# Patient Record
Sex: FEMALE | Race: WHITE
[De-identification: names, ages, dates, MRNs, and addresses within clinical notes are randomized per-mention and may not be internally consistent; named-entity substitution may affect disease eponyms.]

---

## 2019-04-10 ENCOUNTER — HOSPITAL ENCOUNTER (INPATIENT)
Dept: HOSPITAL 25 - MCHOB | Age: 34
LOS: 2 days | Discharge: HOME | DRG: 540 | End: 2019-04-12
Attending: OBSTETRICS & GYNECOLOGY | Admitting: OBSTETRICS & GYNECOLOGY
Payer: COMMERCIAL

## 2019-04-10 DIAGNOSIS — O34.211: Primary | ICD-10-CM

## 2019-04-10 DIAGNOSIS — Z3A.39: ICD-10-CM

## 2019-04-10 PROCEDURE — 4A1HXCZ MONITORING OF PRODUCTS OF CONCEPTION, CARDIAC RATE, EXTERNAL APPROACH: ICD-10-PCS | Performed by: OBSTETRICS & GYNECOLOGY

## 2019-04-10 PROCEDURE — 85025 COMPLETE CBC W/AUTO DIFF WBC: CPT

## 2019-04-10 PROCEDURE — 90707 MMR VACCINE SC: CPT

## 2019-04-10 PROCEDURE — 36415 COLL VENOUS BLD VENIPUNCTURE: CPT

## 2019-04-10 RX ADMIN — OXYCODONE HYDROCHLORIDE AND ACETAMINOPHEN PRN TAB: 5; 325 TABLET ORAL at 18:22

## 2019-04-10 RX ADMIN — DOCUSATE SODIUM SCH: 100 CAPSULE, LIQUID FILLED ORAL at 14:57

## 2019-04-10 RX ADMIN — SIMETHICONE CHEW TAB 80 MG SCH MG: 80 TABLET ORAL at 18:21

## 2019-04-10 RX ADMIN — SIMETHICONE CHEW TAB 80 MG SCH MG: 80 TABLET ORAL at 20:18

## 2019-04-10 RX ADMIN — DOCUSATE SODIUM SCH MG: 100 CAPSULE, LIQUID FILLED ORAL at 20:18

## 2019-04-10 RX ADMIN — IBUPROFEN SCH MG: 600 TABLET, FILM COATED ORAL at 21:41

## 2019-04-10 RX ADMIN — IBUPROFEN SCH MG: 600 TABLET, FILM COATED ORAL at 15:37

## 2019-04-10 RX ADMIN — OXYCODONE HYDROCHLORIDE AND ACETAMINOPHEN PRN TAB: 5; 325 TABLET ORAL at 21:42

## 2019-04-11 LAB
BASOPHILS # BLD AUTO: 0 10^3/UL (ref 0–0.2)
EOSINOPHIL # BLD AUTO: 0 10^3/UL (ref 0–0.6)
HCT VFR BLD AUTO: 31 % (ref 33–41)
HGB BLD-MCNC: 10.2 G/DL (ref 12–16)
LYMPHOCYTES # BLD AUTO: 3.2 10^3/UL (ref 1–4.8)
MCH RBC QN AUTO: 30 PG (ref 27–31)
MCHC RBC AUTO-ENTMCNC: 34 G/DL (ref 31–36)
MCV RBC AUTO: 89 FL (ref 80–97)
MONOCYTES # BLD AUTO: 1.5 10^3/UL (ref 0–0.8)
NEUTROPHILS # BLD AUTO: 13.1 10^3/UL (ref 1.5–7.7)
NRBC # BLD AUTO: 0 10^3/UL
NRBC BLD QL AUTO: 0
PLATELET # BLD AUTO: 195 10^3/UL (ref 150–450)
RBC # BLD AUTO: 3.42 10^6 /UL (ref 3.7–4.87)
WBC # BLD AUTO: 17.9 10^3/UL (ref 3.5–10.8)

## 2019-04-11 RX ADMIN — IBUPROFEN PRN MG: 600 TABLET, FILM COATED ORAL at 04:18

## 2019-04-11 RX ADMIN — OXYCODONE HYDROCHLORIDE AND ACETAMINOPHEN PRN TAB: 5; 325 TABLET ORAL at 02:04

## 2019-04-11 RX ADMIN — ACETAMINOPHEN PRN MG: 325 TABLET ORAL at 06:07

## 2019-04-11 RX ADMIN — IBUPROFEN PRN MG: 600 TABLET, FILM COATED ORAL at 16:09

## 2019-04-11 RX ADMIN — IBUPROFEN SCH: 600 TABLET, FILM COATED ORAL at 07:08

## 2019-04-11 RX ADMIN — SIMETHICONE CHEW TAB 80 MG SCH MG: 80 TABLET ORAL at 14:17

## 2019-04-11 RX ADMIN — DOCUSATE SODIUM SCH MG: 100 CAPSULE, LIQUID FILLED ORAL at 14:17

## 2019-04-11 RX ADMIN — DOCUSATE SODIUM SCH MG: 100 CAPSULE, LIQUID FILLED ORAL at 21:42

## 2019-04-11 RX ADMIN — IBUPROFEN PRN MG: 600 TABLET, FILM COATED ORAL at 10:02

## 2019-04-11 RX ADMIN — IBUPROFEN PRN MG: 600 TABLET, FILM COATED ORAL at 21:48

## 2019-04-11 RX ADMIN — DOCUSATE SODIUM SCH MG: 100 CAPSULE, LIQUID FILLED ORAL at 10:02

## 2019-04-11 RX ADMIN — SIMETHICONE CHEW TAB 80 MG SCH MG: 80 TABLET ORAL at 21:42

## 2019-04-11 RX ADMIN — SIMETHICONE CHEW TAB 80 MG SCH MG: 80 TABLET ORAL at 18:29

## 2019-04-11 RX ADMIN — SIMETHICONE CHEW TAB 80 MG SCH MG: 80 TABLET ORAL at 10:02

## 2019-04-11 RX ADMIN — ACETAMINOPHEN PRN MG: 325 TABLET ORAL at 21:48

## 2019-04-11 RX ADMIN — ACETAMINOPHEN PRN MG: 325 TABLET ORAL at 18:29

## 2019-04-11 NOTE — OP
DATE OF OPERATION:  04/10/19 - ROOM #117

 

DATE OF BIRTH:  [birth year]

 

SURGEON:  Carmelina Han MD

 

ASSISTANTS:  Marleen Arthur CNM and Miriam Sims CNM

 

ANESTHESIOLOGIST:  Dr. Canela.

 

ANESTHESIA:  Spinal.

 

PRE-OP DIAGNOSIS:  39 weeks' gestation with history of previous  
section.

 

POST-OP DIAGNOSIS:  39 weeks' gestation with history of previous  
section.

 

OPERATIVE PROCEDURE:  Repeat low-transverse  section with vacuum assist.

 

ESTIMATED BLOOD LOSS:  600 cc.

 

URINE OUTPUT:  150 cc.

 

IV FLUIDS:  2400 cc lactated Ringer's.

 

MATERIALS TO LAB:  Cord blood.

 

INDICATIONS:  This patient was a 33-year-old  2, para 1 at 39 weeks' 
gestation, who presented for a repeat  section today.  The patient's 
prenatal course was unremarkable.  She was extensively counseled and consent 
was signed.

 

FINDINGS:  Normal-appearing uterus, fallopian tubes, and ovaries.  Delivery was 
productive of a male infant weighing 7 pounds 9 ounces with Apgars of 9 and 9. 
Time of delivery was 11:35. 

 

COMPLICATIONS:  None.

 

DESCRIPTION OF PROCEDURE:  The risks, benefits, and alternatives were described 
to the patient, and informed consent was obtained.  The patient was taken to 
the operating room with IV running, where spinal anesthesia was induced and 
found to be adequate.  The patient was prepped and draped in normal sterile 
fashion in the dorsal supine position with a leftward tilt.  A Pfannenstiel 
skin incision was made with a scalpel through the patient's previous incision.  
This was carried down to the underlying fascia using the scalpel.  The fascia 
was scored in the midline, and the incision was extended using Quinonez scissors.  
The fascia was dissected off the underlying rectus muscles using blunt and 
sharp dissection.  The rectus muscles were  in the midline using 
dissection with a Beena clamp.  The peritoneum was then entered bluntly.  An 
Wily retractor was placed into the peritoneal cavity and tightened down 
against the skin.  A bladder flap was created sharply using Metzenbaum 
scissors.  A low transverse uterine incision was then made with the scalpel.  
This was carried down to the amniotic membranes.  The membranes were then 
ruptured, productive of clear fluid.  The uterine incision was extended using 
blunt traction.  The fetal head was elevated to the level of the incision, but 
the head could not be adequately guided up to through the incision.  A Kiwi 
vacuum was placed on the head and with gentle traction and fundal pressure, the 
head was able to be lifted to the incision.  The Kiwi then was found be 
defective, popping off with minimal tension three times.  Fortunately, at that 
point, with fundal pressure, the head delivered without difficulty.  The 
shoulders then were also both delivered and the body followed.  The infant had 
good tone and cried shortly after delivery.  The cord was doubly clamped and 
cut.  The infant was then handed to the awaiting pediatrician.

 

Cord blood was collected.  The placenta was delivered with manual extraction.  
The uterus was then exteriorized and cleared of all clots and debris.  The 
uterine incision was then reapproximated using 0 Vicryl in a running-locked 
fashion.  A second layer of imbricating 0 Vicryl sutures was then also placed 
for good hemostasis.  The posterior cul-de-sac was irrigated with saline.  The 
uterus was then returned to the abdomen.  The incision was reinspected and 
still noted to be hemostatic.  The peritoneum was closed with 3-0 Vicryl in a 
running fashion.  The fascia was closed with 0 Vicryl in a running fashion.  
The subcutaneous tissues were copiously irrigated and made hemostatic using the 
Bovie.  The subcutaneous tissues were then reapproximated using 3-0 Vicryl in 
interrupted sutures. The skin was then closed with 4-0 Monocryl in a 
subcuticular stitch and overlaid with Mastisol and steristrips.  A sterile 
bandage was then placed over the incision.  The patient tolerated the procedure 
well.  Sponge, lap, and needle counts were correct x2.

 

 [id number]/[facility] #: [id number]

MADIE

## 2019-04-12 VITALS — SYSTOLIC BLOOD PRESSURE: 125 MMHG | DIASTOLIC BLOOD PRESSURE: 76 MMHG

## 2019-04-12 RX ADMIN — ACETAMINOPHEN PRN MG: 325 TABLET ORAL at 03:34

## 2019-04-12 RX ADMIN — IBUPROFEN PRN MG: 600 TABLET, FILM COATED ORAL at 10:10

## 2019-04-12 RX ADMIN — DOCUSATE SODIUM SCH MG: 100 CAPSULE, LIQUID FILLED ORAL at 07:47

## 2019-04-12 RX ADMIN — ACETAMINOPHEN PRN MG: 325 TABLET ORAL at 07:47

## 2019-04-12 RX ADMIN — IBUPROFEN PRN MG: 600 TABLET, FILM COATED ORAL at 03:34

## 2019-04-12 RX ADMIN — SIMETHICONE CHEW TAB 80 MG SCH MG: 80 TABLET ORAL at 07:47
